# Patient Record
Sex: MALE | Race: WHITE | NOT HISPANIC OR LATINO | Employment: FULL TIME | URBAN - METROPOLITAN AREA
[De-identification: names, ages, dates, MRNs, and addresses within clinical notes are randomized per-mention and may not be internally consistent; named-entity substitution may affect disease eponyms.]

---
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
## 2020-06-06 ENCOUNTER — ANCILLARY PROCEDURE (OUTPATIENT)
Dept: GENERAL RADIOLOGY | Facility: CLINIC | Age: 20
End: 2020-06-06
Attending: NURSE PRACTITIONER

## 2020-06-06 ENCOUNTER — OFFICE VISIT (OUTPATIENT)
Dept: URGENT CARE | Facility: URGENT CARE | Age: 20
End: 2020-06-06

## 2020-06-06 VITALS
WEIGHT: 159 LBS | SYSTOLIC BLOOD PRESSURE: 124 MMHG | OXYGEN SATURATION: 98 % | BODY MASS INDEX: 22.76 KG/M2 | HEIGHT: 70 IN | TEMPERATURE: 99.8 F | HEART RATE: 80 BPM | DIASTOLIC BLOOD PRESSURE: 58 MMHG

## 2020-06-06 DIAGNOSIS — M79.671 PAIN OF RIGHT HEEL: ICD-10-CM

## 2020-06-06 DIAGNOSIS — S90.31XA CONTUSION OF RIGHT HEEL, INITIAL ENCOUNTER: Primary | ICD-10-CM

## 2020-06-06 PROCEDURE — 99214 OFFICE O/P EST MOD 30 MIN: CPT | Performed by: NURSE PRACTITIONER

## 2020-06-06 PROCEDURE — 73650 X-RAY EXAM OF HEEL: CPT | Mod: RT

## 2020-06-06 ASSESSMENT — MIFFLIN-ST. JEOR: SCORE: 1737.47

## 2020-06-06 NOTE — PATIENT INSTRUCTIONS
Increase rest and fluids. Tylenol and/or Ibuprofen for comfort.  If your symptoms worsen or do not resolve follow up with your primary care provider in 1 week and sooner if needed.      annette Hardwick  Indications for emergent return to emergency department discussed with patient, who verbalized good understanding and agreement.  Patient understands the limitations of today's evaluation.           Patient Education     Foot Contusion  You have a contusion. This is also called a bruise. There is swelling and some bleeding under the skin, but no broken bones. This injury generally takes a few days to a few weeks to heal.  During that time, the bruise will typically change in color from reddish, to purple-blue, to greenish-yellow, then to yellow-brown.  Home care    Elevate the foot to reduce pain and swelling. As much as possible, sit or lie down with the foot raised about the level of your heart. This is especially important during the first 48 hours.    Ice the foot to help reduce pain and swelling. Wrap a cold source (ice pack or ice cubes in a plastic bag) in a thin towel. Apply to the bruised area for 20 minutes every 1 to 2 hours the first day. Continue this 3 to 4 times a day until the pain and swelling goes away.    Unless another medicine was prescribed, you can take acetaminophen, ibuprofen, or naproxen to control pain. (If you have chronic liver or kidney disease or ever had a stomach ulcer or gastrointestinal bleeding, talk with your healthcare provider before using these medicines.)  Follow up  Follow up with your healthcare provider or our staff as advised. Call if you are not improving within 1 to 2 weeks.  When to seek medical advice   Call your healthcare provider right away if you have any of the following:    Increased pain or swelling    Foot or leg becomes cold, blue, numb or tingly    Signs of infection: Warmth, drainage, or increased redness or pain around the bruise    Inability to move  the injured foot     Frequent bruising for unknown reasons  Date Last Reviewed: 2/1/2017 2000-2019 [employer]. [address] [facility], [place] [address]. All rights reserved. This information is not intended as a substitute for professional medical care. Always follow your healthcare professional's instructions.

## 2020-06-06 NOTE — PROGRESS NOTES
"Subjective     Eliceo oRse is a 19 year old male who presents to clinic today for the following health issues:    HPI     Chief Complaint   Patient presents with     Musculoskeletal Problem     last sunday night, slipped and kicked his fridge by accident, right heel pain, bearing wt on heel cause severe pain, can feel bump or divet in his heel that may not be normal         Patient Active Problem List   Diagnosis     Adjustment disorder with depressed mood     Seasonal allergic rhinitis     Adjustment disorder with mixed disturbance of emotions and conduct     History reviewed. No pertinent surgical history.    Social History     Tobacco Use     Smoking status: Current Every Day Smoker     Smokeless tobacco: Never Used     Tobacco comment: mom smokes outside   Substance Use Topics     Alcohol use: Yes     Family History   Problem Relation Age of Onset     Substance Abuse Mother      Substance Abuse Father      Substance Abuse Maternal Grandmother      Substance Abuse Paternal Grandmother      Substance Abuse Brother          No current outpatient medications on file.     Allergies   Allergen Reactions     Nka [No Known Allergies]      Seasonal Allergies      Cats Itching         Reviewed and updated as needed this visit by Provider         Review of Systems   Constitutional, HEENT, cardiovascular, pulmonary, GI, , musculoskeletal, neuro, skin, endocrine and psych systems are negative, except as otherwise noted.      Objective    /58   Pulse 80   Temp 99.8  F (37.7  C) (Tympanic)   Ht 1.778 m (5' 10\")   Wt 72.1 kg (159 lb)   SpO2 98%   BMI 22.81 kg/m    Body mass index is 22.81 kg/m .  Physical Exam   GENERAL: healthy, alert and no distress, nontoxic in appearance  EYES: Eyes grossly normal to inspection, PERRL and conjunctivae and sclerae normal  HENT: normocephalic and atraumatic  NECK: supple with full ROM  ABDOMEN: soft, nontender  MS: no gross musculoskeletal defects noted, no edema, bottom " of right heel very tender to touch and a little pink. No bony abnormality, no swelling.    Diagnostic Test Results: No fracture seen. No heel spur seen.    CALCANEUS/OS CALSIS/HEEL TWO VIEWS RIGHT 6/6/2020 12:12 PM      HISTORY: Pain of right heel     COMPARISON: None.     FINDINGS: There is no significant degenerative change. There is no  acute fracture. No dislocation. There are no worrisome bony lesions.                                                                      IMPRESSION:  No acute osseous abnormality demonstrated.     HAYLEY LOPES MD    Labs reviewed in Epic  No results found for this or any previous visit (from the past 24 hour(s)).        Assessment & Plan   Problem List Items Addressed This Visit     None      Visit Diagnoses     Contusion of right heel, initial encounter    -  Primary    Pain of right heel        Relevant Orders    XR Calcaneus Right G/E 2 Views (Completed)             Tobacco Cessation:   reports that he has been smoking. He has never used smokeless tobacco.  Tobacco Cessation Action Plan: Information offered: Patient not interested at this time        Patient Instructions   Increase rest and fluids. Tylenol and/or Ibuprofen for comfort.  If your symptoms worsen or do not resolve follow up with your primary care provider in 1 week and sooner if needed.      RICHARD advice, kyches  Indications for emergent return to emergency department discussed with patient, who verbalized good understanding and agreement.  Patient understands the limitations of today's evaluation.           Patient Education     Foot Contusion  You have a contusion. This is also called a bruise. There is swelling and some bleeding under the skin, but no broken bones. This injury generally takes a few days to a few weeks to heal.  During that time, the bruise will typically change in color from reddish, to purple-blue, to greenish-yellow, then to yellow-brown.  Home care    Elevate the foot to reduce pain  and swelling. As much as possible, sit or lie down with the foot raised about the level of your heart. This is especially important during the first 48 hours.    Ice the foot to help reduce pain and swelling. Wrap a cold source (ice pack or ice cubes in a plastic bag) in a thin towel. Apply to the bruised area for 20 minutes every 1 to 2 hours the first day. Continue this 3 to 4 times a day until the pain and swelling goes away.    Unless another medicine was prescribed, you can take acetaminophen, ibuprofen, or naproxen to control pain. (If you have chronic liver or kidney disease or ever had a stomach ulcer or gastrointestinal bleeding, talk with your healthcare provider before using these medicines.)  Follow up  Follow up with your healthcare provider or our staff as advised. Call if you are not improving within 1 to 2 weeks.  When to seek medical advice   Call your healthcare provider right away if you have any of the following:    Increased pain or swelling    Foot or leg becomes cold, blue, numb or tingly    Signs of infection: Warmth, drainage, or increased redness or pain around the bruise    Inability to move the injured foot     Frequent bruising for unknown reasons  Date Last Reviewed: 2/1/2017 2000-2019 [employer]. [address] [place] [address], [place] [address]. All rights reserved. This information is not intended as a substitute for professional medical care. Always follow your healthcare professional's instructions.             No follow-ups on file.    ROSIE Bean [facility]

## 2020-06-06 NOTE — LETTER
June 6, 2020      Eliceo Rose  [id number] [facility] [id number]        To Whom It May Concern:    Eliceo Rose was seen in our clinic. Please release from work the next 7 days due to foot injury.      Sincerely,        ROSIE Bean CNP

## 2024-07-02 ENCOUNTER — HOSPITAL ENCOUNTER (EMERGENCY)
Facility: CLINIC | Age: 24
Discharge: HOME | End: 2024-07-02
Payer: OTHER MISCELLANEOUS

## 2024-07-02 ENCOUNTER — APPOINTMENT (OUTPATIENT)
Dept: GENERAL RADIOLOGY | Facility: CLINIC | Age: 24
End: 2024-07-02
Attending: NURSE PRACTITIONER

## 2024-07-02 VITALS
TEMPERATURE: 98 F | SYSTOLIC BLOOD PRESSURE: 125 MMHG | RESPIRATION RATE: 16 BRPM | DIASTOLIC BLOOD PRESSURE: 73 MMHG | HEART RATE: 80 BPM | OXYGEN SATURATION: 99 %

## 2024-07-02 DIAGNOSIS — S61.313A LACERATION OF LEFT MIDDLE FINGER WITHOUT FOREIGN BODY WITH DAMAGE TO NAIL, INITIAL ENCOUNTER: ICD-10-CM

## 2024-07-02 PROCEDURE — 90471 IMMUNIZATION ADMIN: CPT | Performed by: NURSE PRACTITIONER

## 2024-07-02 PROCEDURE — 90715 TDAP VACCINE 7 YRS/> IM: CPT | Performed by: NURSE PRACTITIONER

## 2024-07-02 PROCEDURE — 250N000011 HC RX IP 250 OP 636: Performed by: NURSE PRACTITIONER

## 2024-07-02 PROCEDURE — 99203 OFFICE O/P NEW LOW 30 MIN: CPT | Performed by: NURSE PRACTITIONER

## 2024-07-02 PROCEDURE — G0463 HOSPITAL OUTPT CLINIC VISIT: HCPCS | Mod: 25 | Performed by: NURSE PRACTITIONER

## 2024-07-02 PROCEDURE — 73130 X-RAY EXAM OF HAND: CPT | Mod: LT

## 2024-07-02 RX ORDER — CEPHALEXIN 500 MG/1
500 CAPSULE ORAL 4 TIMES DAILY
Qty: 28 CAPSULE | Refills: 0 | Status: SHIPPED | OUTPATIENT
Start: 2024-07-02 | End: 2024-07-09

## 2024-07-02 RX ADMIN — CLOSTRIDIUM TETANI TOXOID ANTIGEN (FORMALDEHYDE INACTIVATED), CORYNEBACTERIUM DIPHTHERIAE TOXOID ANTIGEN (FORMALDEHYDE INACTIVATED), BORDETELLA PERTUSSIS TOXOID ANTIGEN (GLUTARALDEHYDE INACTIVATED), BORDETELLA PERTUSSIS FILAMENTOUS HEMAGGLUTININ ANTIGEN (FORMALDEHYDE INACTIVATED), BORDETELLA PERTUSSIS PERTACTIN ANTIGEN, AND BORDETELLA PERTUSSIS FIMBRIAE 2/3 ANTIGEN 0.5 ML: 5; 2; 2.5; 5; 3; 5 INJECTION, SUSPENSION INTRAMUSCULAR at 15:24

## 2024-07-02 ASSESSMENT — COLUMBIA-SUICIDE SEVERITY RATING SCALE - C-SSRS
2. HAVE YOU ACTUALLY HAD ANY THOUGHTS OF KILLING YOURSELF IN THE PAST MONTH?: NO
1. IN THE PAST MONTH, HAVE YOU WISHED YOU WERE DEAD OR WISHED YOU COULD GO TO SLEEP AND NOT WAKE UP?: NO
6. HAVE YOU EVER DONE ANYTHING, STARTED TO DO ANYTHING, OR PREPARED TO DO ANYTHING TO END YOUR LIFE?: NO

## 2024-07-02 ASSESSMENT — ACTIVITIES OF DAILY LIVING (ADL): ADLS_ACUITY_SCORE: 35

## 2024-07-02 NOTE — DISCHARGE INSTRUCTIONS
Recommend keeping clean and dry, leave dressing on for 24 hours.  On day 4 may leave open to air to begin healing.  Bacitracin and dressing changes every 12 hours day 2 through 3.  Antibiotics have already been ordered for you 4 times daily for 7 days to prevent infection.  If you develop signs and symptoms of infection including increased redness, fevers, chills, pus colored drainage despite being on antibiotics please return to the emergency department for further evaluation.  Your tetanus immunization was updated today and you will be covered for the next 10 years.

## 2024-07-02 NOTE — ED PROVIDER NOTES
ED Provider Note  [facility]      History     Chief Complaint   Patient presents with    Laceration    Work Comp     The history is provided by the patient and a relative.     Eliceo Rose is a 23 year old male who presents with a laceration of his left middle finger, reports that yesterday he was using a 's knife and mincing garlic and lacerated his finger tip off.  Reports that a portion of his fingernail was also removed this laceration.  Injury occurred yesterday.  Reports that bleeding was stopped with direct pressure.  Unsure if he has had a current tetanus immunization.  Reports he has had some previous lacerations to this finger and has needed sutures in the past.        Allergies:  Allergies   Allergen Reactions    Nka [No Known Allergies]     Seasonal Allergies     Cats Itching       Problem List:    Patient Active Problem List    Diagnosis Date Noted    Adjustment disorder with mixed disturbance of emotions and conduct 06/02/2014     Priority: Medium    Seasonal allergic rhinitis 09/09/2013     Priority: Medium    Adjustment disorder with depressed mood 02/25/2013     Priority: Medium        Past Medical History:    Past Medical History:   Diagnosis Date    Allergy, unspecified not elsewhere classified        Past Surgical History:    No past surgical history on file.    Family History:    Family History   Problem Relation Age of Onset    Substance Abuse Mother     Substance Abuse Father     Substance Abuse Maternal Grandmother     Substance Abuse Paternal Grandmother     Substance Abuse Brother        Social History:  Marital Status:  Single [1]  Social History     Tobacco Use    Smoking status: Every Day    Smokeless tobacco: Never    Tobacco comments:     mom smokes outside   Substance Use Topics    Alcohol use: Yes    Drug use: Yes        Medications:    cephALEXin (KEFLEX) 500 MG capsule          Review of Systems  A medically appropriate review of systems was  performed with pertinent positives and negatives noted in the HPI, and all other systems negative.    Physical Exam   Patient Vitals for the past 24 hrs:   BP Temp Temp src Pulse Resp SpO2   07/02/24 1511 125/73 98  F (36.7  C) Tympanic 80 16 99 %          Physical Exam  General: alert and in no acute distress on arrival  Head: atraumatic, normocephalic  Lungs:  nonlabored  CV:  extremities warm and perfused  Abd: nondistended  Skin: no rashes, no diaphoresis and skin color normal, laceration to left middle fingertip with a portion of his fingernail excised, has exposed tissue present that is pink with normal capillary refill.  See procedure note  Neuro: Patient awake, alert, speech is fluent, no focal deficits  Psychiatric: affect/mood normal, reported historian.      ED Course                 [facility]    -Laceration Repair    Date/Time: 7/2/2024 10:20 PM    Performed by: Megan Evans APRN CNP  Authorized by: Megan Evans APRN CNP    Risks, benefits and alternatives discussed.      ANESTHESIA (see MAR for exact dosages):     Anesthesia method:  Local infiltration    Local anesthetic:  Bupivacaine 0.5% WITH epi  LACERATION DETAILS     Location:  Finger    Finger location:  L long finger    Length (cm):  2    Depth (mm):  2    REPAIR TYPE:     Repair type:  Simple    EXPLORATION:     Hemostasis achieved with:  Direct pressure and epinephrine    Wound exploration: wound explored through full range of motion and entire depth of wound probed and visualized      Wound extent: areolar tissue not violated, fascia not violated, no foreign body, no signs of injury, no nerve damage, no tendon damage, no underlying fracture and no vascular damage      Contaminated: yes      TREATMENT:     Area cleansed with:  Roberto Carlos    Amount of cleaning:  Extensive    Irrigation solution:  Sterile saline    Irrigation volume:  300    Irrigation method:  Pressure wash    Visualized foreign  bodies/material removed: no      POST-PROCEDURE DETAILS     Dressing:  Antibiotic ointment and adhesive bandage      PROCEDURE  Describe Procedure: Unable to repair lacerated fingertip as this was completely excised during laceration.  A portion of the nail was excised during this laceration as well.  This wound was cleaned and he had significant amount of bleeding that occurred and to stop bleeding bupivacaine with epinephrine was used to stop bleeding.  Patient Tolerance:  Patient tolerated the procedure well with no immediate complications                      Results for orders placed or performed during the hospital encounter of 07/02/24 (from the past 24 hour(s))   XR Hand Left G/E 3 Views    Narrative    HAND LEFT THREE OR MORE VIEWS   7/2/2024 3:46 PM     HISTORY: Left middle finger laceration, unsure if he has cut into any  bone.    COMPARISON: None.      Impression    IMPRESSION: Soft tissue injury to the distal end of the long finger.  There is no evidence of fracture, missing bone or radiopaque foreign  body.       MEDICATIONS GIVEN IN THE EMERGENCY DEPARTMENT:  Medications   Tdap (tetanus-diphtheria-acell pertussis) (ADACEL) injection 0.5 mL (0.5 mLs Intramuscular $Given 7/2/24 1524)                Assessments & Plan (with Medical Decision Making)  23 year old male who presents to the Urgent Care for evaluation of laceration of left middle finger without foreign body with damage to nail, initial encounter.  Unable to repair laceration, 0.5% bupivacaine with epinephrine was used to stop the bleeding from the laceration site.  X-ray was negative for fracture, missing bone or foreign body.  X-rays were personally viewed, radiology interpretation reviewed.  This information was reviewed with the patient, Keflex was ordered 4 times daily for 7 days.  He was given a Tdap immunization to prevent tetanus.  Reviewed signs and symptoms of infection including increased redness, increased pain, pus colored drainage  advised to return for further evaluation if any of these occur after starting oral antibiotics.       I have reviewed the nursing notes.    I have reviewed the findings, diagnosis, plan and need for follow up with the patient.        NEW PRESCRIPTIONS STARTED AT TODAY'S ER VISIT  Discharge Medication List as of 7/2/2024  4:03 PM        START taking these medications    Details   cephALEXin (KEFLEX) 500 MG capsule Take 1 capsule (500 mg) by mouth 4 times daily for 7 days, Disp-28 capsule, R-0, E-Prescribe             Final diagnoses:   Laceration of left middle finger without foreign body with damage to nail, initial encounter       7/2/2024   [facility] EMERGENCY DEPT       Megan Evans, APRN CNP  07/02/24 0323

## 2024-07-02 NOTE — ED TRIAGE NOTES
Pt reports work related incident to the left middle finger. Laceration from a knife while mincing garlic yesterday 1100am     most recent TDAP per [facility] record was Aug 2012